# Patient Record
Sex: FEMALE | Race: WHITE
[De-identification: names, ages, dates, MRNs, and addresses within clinical notes are randomized per-mention and may not be internally consistent; named-entity substitution may affect disease eponyms.]

---

## 2017-01-19 LAB
ALBUMIN SERPL-MCNC: 4.4 G/DL (ref 3.2–4.5)
ALT SERPL-CCNC: 14 U/L (ref 0–55)
ANION GAP SERPL CALC-SCNC: 10 MMOL/L (ref 5–14)
AST SERPL-CCNC: 15 U/L (ref 5–34)
BASOPHILS # BLD AUTO: 0 10^3/UL (ref 0–0.1)
BASOPHILS NFR BLD AUTO: 0 % (ref 0–10)
BILIRUB SERPL-MCNC: 0.4 MG/DL (ref 0.1–1)
BUN SERPL-MCNC: 11 MG/DL (ref 7–18)
BUN/CREAT SERPL: 14
CALCIUM SERPL-MCNC: 10.2 MG/DL (ref 8.5–10.1)
CHLORIDE SERPL-SCNC: 105 MMOL/L (ref 98–107)
CO2 SERPL-SCNC: 24 MMOL/L (ref 21–32)
CREAT SERPL-MCNC: 0.81 MG/DL (ref 0.6–1.3)
EOSINOPHIL # BLD AUTO: 0.1 10^3/UL (ref 0–0.3)
EOSINOPHIL NFR BLD AUTO: 1 % (ref 0–10)
ERYTHROCYTE [DISTWIDTH] IN BLOOD BY AUTOMATED COUNT: 13.4 % (ref 10–14.5)
GFR SERPLBLD BASED ON 1.73 SQ M-ARVRAT: > 60 ML/MIN
GLUCOSE SERPL-MCNC: 77 MG/DL (ref 70–105)
LYMPHOCYTES # BLD AUTO: 3 X 10^3 (ref 1–4)
LYMPHOCYTES NFR BLD AUTO: 34 % (ref 12–44)
MCH RBC QN AUTO: 31 PG (ref 25–34)
MCHC RBC AUTO-ENTMCNC: 34 G/DL (ref 32–36)
MCV RBC AUTO: 91 FL (ref 80–99)
MONOCYTES # BLD AUTO: 0.6 X 10^3 (ref 0–1)
MONOCYTES NFR BLD AUTO: 7 % (ref 0–12)
NEUTROPHILS # BLD AUTO: 5.1 X 10^3 (ref 1.8–7.8)
NEUTROPHILS NFR BLD AUTO: 58 % (ref 42–75)
PLATELET # BLD: 224 10^3/UL (ref 130–400)
PMV BLD AUTO: 11.1 FL (ref 7.4–10.4)
POTASSIUM SERPL-SCNC: 4.1 MMOL/L (ref 3.6–5)
PROT SERPL-MCNC: 6.9 G/DL (ref 6.4–8.2)
RBC # BLD AUTO: 4.44 10^6/UL (ref 4.35–5.85)
SODIUM SERPL-SCNC: 139 MMOL/L (ref 135–145)
WBC # BLD AUTO: 8.7 10^3/UL (ref 4.3–11)

## 2017-01-19 NOTE — XMS REPORT
Continuity of Care Document

 Created on: 01/15/2015



BEVERLY JARVIS

External Reference #: [id number]

: 1965

Sex: Female



Demographics







 Address  [address] [place]  [address]

 

 Home Phone  [phone]

 

 Preferred Language  English

 

 Marital Status  Unknown

 

 Taoism Affiliation  Unknown

 

 Race  Unknown

 

 Ethnic Group  Unknown





Author







 Author  [facility]

 

 Organization  [facility]

 

 Address  Unknown

 

 Phone  Unavailable







Support







 Name  Relationship  Address  Phone

 

 JUSTO FARRELL MD  Caregiver  [address]

[place]  [address] [phone]

 

 JAQUI RAJAN MD  Caregiver  [address]

[place]  [address] [phone]

 

 [facility]  [place]  [address] [phone]







Care Team Providers







 Care Team Member Name  Role  Phone

 

 JUSTO FARRELL MD  PCP  [phone]







Insurance Providers







 Payer Name  Policy Number  Subscriber Name  Relationship

 

 [employer] Medicare Part A Only  [id number]  Beverly Jarvis  17 Self / Same As Patient







Advance Directives







 Directive  Response  Recorded Date/Time

 

 Advance Directives  No  08 8:13am







Problems

No known problems or medical conditions.



Medications

No known medications.



Social History







 Social History Problem  Response  Recorded Date/Time

 

 Recent Foreign Travel  [place]  2015 10:05am







Hospital Discharge Instructions

No hospital discharge instructions.



Plan of Care

No plan of care.



Functional Status

No functional status results.



Allergies, Adverse Reactions, Alerts







 Allergen  Type  Severity  Reaction  Status  Last Updated

 

 No Known Drug Allergies  Allergy  Unknown     Active  08







Immunizations

No immunization records.



Vital Signs

No known vital signs results.



Results

No known relevant diagnostic tests, laboratory data and/or discharge summary.



Procedures

No known history of procedures.



Encounters







 Encounter  Location  Date/Time

 

 Discharged Recurring  [facility]  01/12/15 1:32pm

## 2017-01-26 ENCOUNTER — HOSPITAL ENCOUNTER (OUTPATIENT)
Dept: HOSPITAL 75 - PAR | Age: 52
LOS: 83 days | Discharge: HOME | End: 2017-04-19
Attending: INTERNAL MEDICINE
Payer: COMMERCIAL

## 2017-01-26 DIAGNOSIS — Z85.3: ICD-10-CM

## 2017-01-26 DIAGNOSIS — Z08: Primary | ICD-10-CM

## 2017-01-26 DIAGNOSIS — Z92.21: ICD-10-CM

## 2017-01-26 DIAGNOSIS — Z92.3: ICD-10-CM

## 2017-01-26 PROCEDURE — 99213 OFFICE O/P EST LOW 20 MIN: CPT

## 2017-01-26 PROCEDURE — 85025 COMPLETE CBC W/AUTO DIFF WBC: CPT

## 2017-01-26 PROCEDURE — 36415 COLL VENOUS BLD VENIPUNCTURE: CPT

## 2017-01-26 PROCEDURE — 80053 COMPREHEN METABOLIC PANEL: CPT

## 2017-01-26 PROCEDURE — 86300 IMMUNOASSAY TUMOR CA 15-3: CPT

## 2017-07-27 ENCOUNTER — HOSPITAL ENCOUNTER (OUTPATIENT)
Dept: HOSPITAL 75 - ONC | Age: 52
End: 2017-07-27
Attending: INTERNAL MEDICINE
Payer: COMMERCIAL

## 2017-07-27 ENCOUNTER — HOSPITAL ENCOUNTER (OUTPATIENT)
Dept: HOSPITAL 75 - RAD | Age: 52
End: 2017-07-27
Attending: INTERNAL MEDICINE
Payer: COMMERCIAL

## 2017-07-27 DIAGNOSIS — I89.0: ICD-10-CM

## 2017-07-27 DIAGNOSIS — Z85.3: ICD-10-CM

## 2017-07-27 DIAGNOSIS — F32.9: ICD-10-CM

## 2017-07-27 DIAGNOSIS — C50.919: Primary | ICD-10-CM

## 2017-07-27 DIAGNOSIS — F41.9: ICD-10-CM

## 2017-07-27 DIAGNOSIS — Z92.21: ICD-10-CM

## 2017-07-27 DIAGNOSIS — Z92.3: ICD-10-CM

## 2017-07-27 DIAGNOSIS — Z08: Primary | ICD-10-CM

## 2017-07-27 DIAGNOSIS — Z79.899: ICD-10-CM

## 2017-07-27 LAB
ALBUMIN SERPL-MCNC: 4.1 GM/DL (ref 3.2–4.5)
ALT SERPL-CCNC: 15 U/L (ref 0–55)
ANION GAP SERPL CALC-SCNC: 13 MMOL/L (ref 5–14)
AST SERPL-CCNC: 14 U/L (ref 5–34)
BASOPHILS # BLD AUTO: 0 10^3/UL (ref 0–0.1)
BASOPHILS NFR BLD AUTO: 0 % (ref 0–10)
BILIRUB SERPL-MCNC: 0.5 MG/DL (ref 0.1–1)
BUN SERPL-MCNC: 12 MG/DL (ref 7–18)
BUN/CREAT SERPL: 16
CALCIUM SERPL-MCNC: 9.6 MG/DL (ref 8.5–10.1)
CHLORIDE SERPL-SCNC: 104 MMOL/L (ref 98–107)
CO2 SERPL-SCNC: 22 MMOL/L (ref 21–32)
CREAT SERPL-MCNC: 0.77 MG/DL (ref 0.6–1.3)
EOSINOPHIL # BLD AUTO: 0.1 10^3/UL (ref 0–0.3)
EOSINOPHIL NFR BLD AUTO: 1 % (ref 0–10)
ERYTHROCYTE [DISTWIDTH] IN BLOOD BY AUTOMATED COUNT: 13.2 % (ref 10–14.5)
GFR SERPLBLD BASED ON 1.73 SQ M-ARVRAT: > 60 ML/MIN
GLUCOSE SERPL-MCNC: 80 MG/DL (ref 70–105)
LYMPHOCYTES # BLD AUTO: 2.6 X 10^3 (ref 1–4)
LYMPHOCYTES NFR BLD AUTO: 25 % (ref 12–44)
MCH RBC QN AUTO: 31 PG (ref 25–34)
MCHC RBC AUTO-ENTMCNC: 34 G/DL (ref 32–36)
MCV RBC AUTO: 92 FL (ref 80–99)
MONOCYTES # BLD AUTO: 0.7 X 10^3 (ref 0–1)
MONOCYTES NFR BLD AUTO: 7 % (ref 0–12)
NEUTROPHILS # BLD AUTO: 7 X 10^3 (ref 1.8–7.8)
NEUTROPHILS NFR BLD AUTO: 68 % (ref 42–75)
PLATELET # BLD: 197 10^3/UL (ref 130–400)
PMV BLD AUTO: 10.4 FL (ref 7.4–10.4)
POTASSIUM SERPL-SCNC: 3.9 MMOL/L (ref 3.6–5)
PROT SERPL-MCNC: 6.8 GM/DL (ref 6.4–8.2)
RBC # BLD AUTO: 4.37 10^6/UL (ref 4.35–5.85)
SODIUM SERPL-SCNC: 139 MMOL/L (ref 135–145)
WBC # BLD AUTO: 10.3 10^3/UL (ref 4.3–11)

## 2017-07-27 PROCEDURE — 36415 COLL VENOUS BLD VENIPUNCTURE: CPT

## 2017-07-27 PROCEDURE — 77067 SCR MAMMO BI INCL CAD: CPT

## 2017-07-27 PROCEDURE — 80053 COMPREHEN METABOLIC PANEL: CPT

## 2017-07-27 PROCEDURE — 86300 IMMUNOASSAY TUMOR CA 15-3: CPT

## 2017-07-27 PROCEDURE — 85025 COMPLETE CBC W/AUTO DIFF WBC: CPT

## 2017-07-28 NOTE — DIAGNOSTIC IMAGING REPORT
EXAMINATION: Bilateral screening mammogram 2D views with

tomosynthesis



The current study was also evaluated with a Computer Aided

Detection (CAD) system.



INDICATION: Screening. No current complaints stated on the

questionnaire.



COMPARISON: 7/25/16.



FINDINGS: The breasts are composed of heterogeneously dense

parenchyma which may decrease mammographic sensitivity. There is

evidence of a prior lumpectomy in the left breast. There is

significant scarring seen with no significant change from the

previous exams.



Occasional benign-appearing calcification is seen. Allowing for

technique and positional differences, no suspicious change is

seen.



IMPRESSION: No significant change.



ACR BI-RADS Category 2: Benign findings.

Result letter will be mailed to the patient.

Note: At least 10% of breast cancer is not imaged by mammography.





  Dictated on workstation # [id number]

## 2017-08-03 ENCOUNTER — HOSPITAL ENCOUNTER (OUTPATIENT)
Dept: HOSPITAL 75 - PAR | Age: 52
LOS: 6 days | Discharge: HOME | End: 2017-08-09
Attending: INTERNAL MEDICINE
Payer: COMMERCIAL

## 2017-08-03 DIAGNOSIS — Z92.3: ICD-10-CM

## 2017-08-03 DIAGNOSIS — Z08: Primary | ICD-10-CM

## 2017-08-03 DIAGNOSIS — Z85.3: ICD-10-CM

## 2017-08-03 DIAGNOSIS — Z92.21: ICD-10-CM

## 2017-08-03 PROCEDURE — 99213 OFFICE O/P EST LOW 20 MIN: CPT

## 2018-08-03 ENCOUNTER — HOSPITAL ENCOUNTER (OUTPATIENT)
Dept: HOSPITAL 75 - RAD | Age: 53
End: 2018-08-03
Attending: INTERNAL MEDICINE
Payer: COMMERCIAL

## 2018-08-03 DIAGNOSIS — Z12.31: Primary | ICD-10-CM

## 2018-08-03 PROCEDURE — 77067 SCR MAMMO BI INCL CAD: CPT

## 2018-08-06 NOTE — DIAGNOSTIC IMAGING REPORT
INDICATION: 

Routine screening.



COMPARISON:       

07/27/2017 and 07/25/2016.



TECHNIQUE: 

2D and 3D bilateral screening mammography was performed with CAD.



FINDINGS:

Both breasts remain heterogeneously dense, limiting the

sensitivity of mammography. Post surgical changes in the medial

left breast are again noted. These appear to be stable when

compared with prior mammograms. No new mass or malignant

appearing microcalcifications are seen. The axillae are

unremarkable.



IMPRESSION:   

No mammographic features suspicious for malignancy are

identified.



ACR BI-RADS Category 2: Benign findings.

Result letter will be mailed to the patient.

Note: At least 10% of breast cancer is not imaged by mammography.



Dictated by: 



  Dictated on workstation # [id number]

## 2019-08-09 ENCOUNTER — HOSPITAL ENCOUNTER (OUTPATIENT)
Dept: HOSPITAL 75 - RAD | Age: 54
End: 2019-08-09
Attending: INTERNAL MEDICINE
Payer: COMMERCIAL

## 2019-08-09 DIAGNOSIS — Z98.890: ICD-10-CM

## 2019-08-09 DIAGNOSIS — Z12.31: Primary | ICD-10-CM

## 2019-08-09 PROCEDURE — 77067 SCR MAMMO BI INCL CAD: CPT

## 2019-08-12 NOTE — DIAGNOSTIC IMAGING REPORT
INDICATION: 

Routine screening.



COMPARISON:      

08/03/2018 and 07/27/2017.



TECHNIQUE: 

2D and 3D bilateral screening mammography was performed with CAD.



FINDINGS:

Both breasts remain heterogeneously dense, limiting the

sensitivity of mammography. Post surgical changes of the medial

left breast are again noted and the breasts appear stable. No

discrete mass or malignant appearing microcalcifications are

seen. The axillae are unremarkable.



IMPRESSION: 

No mammographic features suspicious for malignancy are

identified.



ACR BI-RADS Category 2: Benign findings.

Result letter will be mailed to the patient.

Note: At least 10% of breast cancer is not imaged by mammography.



Dictated by: 



  Dictated on workstation # [id number]

## 2019-08-19 ENCOUNTER — HOSPITAL ENCOUNTER (OUTPATIENT)
Dept: HOSPITAL 75 - ONC | Age: 54
End: 2019-08-19
Attending: INTERNAL MEDICINE
Payer: COMMERCIAL

## 2019-08-19 DIAGNOSIS — Z90.710: ICD-10-CM

## 2019-08-19 DIAGNOSIS — Z79.899: ICD-10-CM

## 2019-08-19 DIAGNOSIS — Z87.440: ICD-10-CM

## 2019-08-19 DIAGNOSIS — F32.9: ICD-10-CM

## 2019-08-19 DIAGNOSIS — Z92.21: ICD-10-CM

## 2019-08-19 DIAGNOSIS — Z92.3: ICD-10-CM

## 2019-08-19 DIAGNOSIS — Z85.3: ICD-10-CM

## 2019-08-19 DIAGNOSIS — Z08: Primary | ICD-10-CM

## 2019-08-19 DIAGNOSIS — F41.9: ICD-10-CM

## 2019-08-19 LAB
ALBUMIN SERPL-MCNC: 4.4 GM/DL (ref 3.2–4.5)
ALP SERPL-CCNC: 97 U/L (ref 40–136)
ALT SERPL-CCNC: 7 U/L (ref 0–55)
BASOPHILS # BLD AUTO: 0 10^3/UL (ref 0–0.1)
BASOPHILS NFR BLD AUTO: 0 % (ref 0–10)
BILIRUB SERPL-MCNC: 0.3 MG/DL (ref 0.1–1)
BUN/CREAT SERPL: 13
CALCIUM SERPL-MCNC: 10.2 MG/DL (ref 8.5–10.1)
CHLORIDE SERPL-SCNC: 105 MMOL/L (ref 98–107)
CO2 SERPL-SCNC: 25 MMOL/L (ref 21–32)
CREAT SERPL-MCNC: 0.79 MG/DL (ref 0.6–1.3)
EOSINOPHIL # BLD AUTO: 0 10^3/UL (ref 0–0.3)
EOSINOPHIL NFR BLD AUTO: 1 % (ref 0–10)
ERYTHROCYTE [DISTWIDTH] IN BLOOD BY AUTOMATED COUNT: 13.4 % (ref 10–14.5)
GFR SERPLBLD BASED ON 1.73 SQ M-ARVRAT: > 60 ML/MIN
GLUCOSE SERPL-MCNC: 80 MG/DL (ref 70–105)
HCT VFR BLD CALC: 44 % (ref 35–52)
HGB BLD-MCNC: 14.8 G/DL (ref 11.5–16)
LYMPHOCYTES # BLD AUTO: 2.6 X 10^3 (ref 1–4)
LYMPHOCYTES NFR BLD AUTO: 31 % (ref 12–44)
MANUAL DIFFERENTIAL PERFORMED BLD QL: NO
MCH RBC QN AUTO: 31 PG (ref 25–34)
MCHC RBC AUTO-ENTMCNC: 34 G/DL (ref 32–36)
MCV RBC AUTO: 93 FL (ref 80–99)
MONOCYTES # BLD AUTO: 0.5 X 10^3 (ref 0–1)
MONOCYTES NFR BLD AUTO: 6 % (ref 0–12)
NEUTROPHILS # BLD AUTO: 5.2 X 10^3 (ref 1.8–7.8)
NEUTROPHILS NFR BLD AUTO: 62 % (ref 42–75)
PLATELET # BLD: 233 10^3/UL (ref 130–400)
PMV BLD AUTO: 10.6 FL (ref 7.4–10.4)
POTASSIUM SERPL-SCNC: 4.1 MMOL/L (ref 3.6–5)
PROT SERPL-MCNC: 7.4 GM/DL (ref 6.4–8.2)
SODIUM SERPL-SCNC: 140 MMOL/L (ref 135–145)
WBC # BLD AUTO: 8.4 10^3/UL (ref 4.3–11)

## 2019-08-19 PROCEDURE — 99213 OFFICE O/P EST LOW 20 MIN: CPT

## 2019-08-19 PROCEDURE — 85025 COMPLETE CBC W/AUTO DIFF WBC: CPT

## 2019-08-19 PROCEDURE — 36415 COLL VENOUS BLD VENIPUNCTURE: CPT

## 2019-08-19 PROCEDURE — 80053 COMPREHEN METABOLIC PANEL: CPT

## 2021-06-11 ENCOUNTER — HOSPITAL ENCOUNTER (OUTPATIENT)
Dept: HOSPITAL 75 - ONC | Age: 56
End: 2021-06-11
Attending: INTERNAL MEDICINE
Payer: SELF-PAY

## 2021-06-11 DIAGNOSIS — Z12.31: Primary | ICD-10-CM

## 2021-06-11 DIAGNOSIS — M79.7: ICD-10-CM

## 2021-06-11 DIAGNOSIS — Z92.21: ICD-10-CM

## 2021-06-11 DIAGNOSIS — C50.912: ICD-10-CM

## 2021-06-11 DIAGNOSIS — Z92.3: ICD-10-CM

## 2021-06-11 DIAGNOSIS — Z90.12: ICD-10-CM

## 2021-06-11 LAB
ALBUMIN SERPL-MCNC: 4.2 GM/DL (ref 3.2–4.5)
ALP SERPL-CCNC: 140 U/L (ref 40–136)
ALT SERPL-CCNC: 9 U/L (ref 0–55)
BASOPHILS # BLD AUTO: 0 10^3/UL (ref 0–0.1)
BASOPHILS NFR BLD AUTO: 0 % (ref 0–10)
BILIRUB SERPL-MCNC: 0.4 MG/DL (ref 0.1–1)
BUN/CREAT SERPL: 11
CALCIUM SERPL-MCNC: 9.8 MG/DL (ref 8.5–10.1)
CHLORIDE SERPL-SCNC: 106 MMOL/L (ref 98–107)
CO2 SERPL-SCNC: 23 MMOL/L (ref 21–32)
CREAT SERPL-MCNC: 0.82 MG/DL (ref 0.6–1.3)
EOSINOPHIL # BLD AUTO: 0.1 10^3/UL (ref 0–0.3)
EOSINOPHIL NFR BLD AUTO: 0 % (ref 0–10)
GFR SERPLBLD BASED ON 1.73 SQ M-ARVRAT: > 60 ML/MIN
GLUCOSE SERPL-MCNC: 90 MG/DL (ref 70–105)
HCT VFR BLD CALC: 41 % (ref 35–52)
HGB BLD-MCNC: 14 G/DL (ref 11.5–16)
LYMPHOCYTES # BLD AUTO: 2.8 10^3/UL (ref 1–4)
LYMPHOCYTES NFR BLD AUTO: 24 % (ref 12–44)
MANUAL DIFFERENTIAL PERFORMED BLD QL: NO
MCH RBC QN AUTO: 31 PG (ref 25–34)
MCHC RBC AUTO-ENTMCNC: 34 G/DL (ref 32–36)
MCV RBC AUTO: 92 FL (ref 80–99)
MONOCYTES # BLD AUTO: 0.8 10^3/UL (ref 0–1)
MONOCYTES NFR BLD AUTO: 7 % (ref 0–12)
NEUTROPHILS # BLD AUTO: 7.8 10^3/UL (ref 1.8–7.8)
NEUTROPHILS NFR BLD AUTO: 68 % (ref 42–75)
PLATELET # BLD: 241 10^3/UL (ref 130–400)
PMV BLD AUTO: 10.3 FL (ref 9–12.2)
POTASSIUM SERPL-SCNC: 4.4 MMOL/L (ref 3.6–5)
PROT SERPL-MCNC: 7.2 GM/DL (ref 6.4–8.2)
SODIUM SERPL-SCNC: 139 MMOL/L (ref 135–145)
WBC # BLD AUTO: 11.4 10^3/UL (ref 4.3–11)

## 2021-06-11 PROCEDURE — 99213 OFFICE O/P EST LOW 20 MIN: CPT

## 2021-06-11 PROCEDURE — 85025 COMPLETE CBC W/AUTO DIFF WBC: CPT

## 2021-06-11 PROCEDURE — 80053 COMPREHEN METABOLIC PANEL: CPT

## 2021-06-18 ENCOUNTER — HOSPITAL ENCOUNTER (OUTPATIENT)
Dept: HOSPITAL 75 - RAD | Age: 56
End: 2021-06-18
Attending: INTERNAL MEDICINE
Payer: COMMERCIAL

## 2021-06-18 DIAGNOSIS — N63.20: Primary | ICD-10-CM

## 2021-06-18 PROCEDURE — 77062 BREAST TOMOSYNTHESIS BI: CPT

## 2021-06-18 PROCEDURE — 76642 ULTRASOUND BREAST LIMITED: CPT

## 2021-06-18 PROCEDURE — 77066 DX MAMMO INCL CAD BI: CPT

## 2021-06-18 NOTE — DIAGNOSTIC IMAGING REPORT
INDICATION: Left breast lump.



COMPARISON: Correlation is made with diagnostic mammogram earlier

the same day.



EXAMINATION: Sonographic interrogation from the 4-8 o'clock

location of the left breast was performed. 



FINDINGS: No sonographic abnormality is seen. No solid or cystic

mass is detected.



IMPRESSION: No sonographic abnormality is detected. Continued

close clinical and self breast exam is recommended to confirm

stability of the areas of palpable abnormality. 



ACR BI-RADS Category 1: Negative.

Result letter will be mailed to the patient.

Note:  At least 10% of breast cancer is not imaged by

mammography.



Dictated by: 



  Dictated on workstation # [id number]

## 2021-06-18 NOTE — DIAGNOSTIC IMAGING REPORT
INDICATION: Left breast carcinoma. Patient complains of palpable

lumps within the left breast.



CORRELATION is made with prior mammograms from 8/9/2019 and

8/3/2018.



2-D and 3-D bilateral diagnostic mammography was performed with

CAD.



Both breasts remain heterogeneously dense, limiting the

sensitivity of mammography. Post-therapeutic changes of the left

breast are stable. No discrete mass or malignant appearing

microcalcifications are seen. Axillae are unremarkable.



IMPRESSION: BI-RADS 0



Stable bilateral mammograms. Even so, sonographic interrogation

of the areas of palpable abnormality in the left breast is

recommended and will be performed today.



ACR BI-RADS Category 0: Incomplete. (Needs additional imaging

evaluation).

Result letter will be mailed to the patient.

Note: At least 10% of breast cancer is not imaged by mammography.



Dictated by: 



  Dictated on workstation # [id number]

## 2021-06-23 ENCOUNTER — HOSPITAL ENCOUNTER (OUTPATIENT)
Dept: HOSPITAL 75 - ONC | Age: 56
LOS: 90 days | Discharge: HOME | End: 2021-09-21
Attending: INTERNAL MEDICINE
Payer: COMMERCIAL

## 2021-06-23 DIAGNOSIS — Z92.21: ICD-10-CM

## 2021-06-23 DIAGNOSIS — Z90.12: ICD-10-CM

## 2021-06-23 DIAGNOSIS — Z65.9: ICD-10-CM

## 2021-06-23 DIAGNOSIS — C50.912: Primary | ICD-10-CM

## 2021-06-23 DIAGNOSIS — Z92.3: ICD-10-CM

## 2021-06-23 PROCEDURE — 99213 OFFICE O/P EST LOW 20 MIN: CPT
